# Patient Record
Sex: FEMALE | NOT HISPANIC OR LATINO
[De-identification: names, ages, dates, MRNs, and addresses within clinical notes are randomized per-mention and may not be internally consistent; named-entity substitution may affect disease eponyms.]

---

## 2017-02-17 ENCOUNTER — LAB SERVICES (OUTPATIENT)
Dept: OTHER | Age: 32
End: 2017-02-17

## 2017-02-17 ENCOUNTER — CHARTING TRANS (OUTPATIENT)
Dept: OBGYN | Age: 32
End: 2017-02-17

## 2017-02-22 LAB — AP REPORT: ABNORMAL

## 2017-03-01 LAB — HPV I/H RISK 4 DNA CVX QL NAA+PROBE: NORMAL

## 2017-03-14 ENCOUNTER — CHARTING TRANS (OUTPATIENT)
Dept: OBGYN | Age: 32
End: 2017-03-14

## 2017-03-14 ENCOUNTER — LAB SERVICES (OUTPATIENT)
Dept: OTHER | Age: 32
End: 2017-03-14

## 2017-03-14 ENCOUNTER — MYAURORA ACCOUNT LINK (OUTPATIENT)
Dept: OTHER | Age: 32
End: 2017-03-14

## 2017-03-14 LAB — PREGNANCY TEST, URINE: NEGATIVE

## 2017-03-16 LAB — AP REPORT: NORMAL

## 2017-05-04 ENCOUNTER — CHARTING TRANS (OUTPATIENT)
Dept: OTHER | Age: 32
End: 2017-05-04

## 2018-11-28 VITALS
HEIGHT: 72 IN | BODY MASS INDEX: 29.26 KG/M2 | WEIGHT: 216 LBS | SYSTOLIC BLOOD PRESSURE: 112 MMHG | DIASTOLIC BLOOD PRESSURE: 80 MMHG

## 2018-11-29 VITALS
HEIGHT: 72 IN | BODY MASS INDEX: 29.53 KG/M2 | SYSTOLIC BLOOD PRESSURE: 118 MMHG | DIASTOLIC BLOOD PRESSURE: 68 MMHG | WEIGHT: 218 LBS

## 2020-10-20 LAB
ANION GAP SERPL CALC-SCNC: 9 MMOL/L (ref 5–19)
APPEARANCE UR: CLEAR
APTT PPP: YELLOW S
BILIRUB UR QL STRIP: NEGATIVE
BUN SERPL-MCNC: 18 MG/DL (ref 7–20)
CALCIUM: 9.5 MG/DL (ref 8.4–10.2)
CHLORIDE SERPL-SCNC: 103 MMOL/L (ref 98–107)
CO2 SERPL-SCNC: 26 MMOL/L (ref 22–30)
ERYTHROCYTE [DISTWIDTH] IN BLOOD BY AUTOMATED COUNT: 12.8 % (ref 11.5–14)
GLUCOSE SERPL-MCNC: 87 MG/DL (ref 75–110)
GLUCOSE UR STRIP-MCNC: NEGATIVE MG/DL
HCT VFR BLD CALC: 40.6 % (ref 36–47)
HGB BLD-MCNC: 14 G/DL (ref 12–15.5)
KETONES UR STRIP-MCNC: NEGATIVE MG/DL
MCH RBC QN AUTO: 29.8 PG (ref 27–33.4)
MCHC RBC AUTO-ENTMCNC: 34.5 G/DL (ref 32–36)
MCV RBC AUTO: 86 FL (ref 80–97)
NITRITE UR QL STRIP: NEGATIVE
PH UR STRIP: 5 [PH] (ref 5–9)
PLATELET # BLD: 262 10^3/UL (ref 150–450)
POTASSIUM SERPL-SCNC: 4.2 MMOL/L (ref 3.6–5)
PROT UR STRIP-MCNC: NEGATIVE MG/DL
RBC # BLD AUTO: 4.7 10^6/UL (ref 3.72–5.28)
SP GR UR STRIP: 1.02
UROBILINOGEN UR-MCNC: NEGATIVE MG/DL (ref ?–2)
WBC # BLD AUTO: 6.6 10^3/UL (ref 4–10.5)

## 2020-10-23 ENCOUNTER — HOSPITAL ENCOUNTER (OUTPATIENT)
Dept: HOSPITAL 62 - OROUT | Age: 35
Discharge: HOME | End: 2020-10-23
Attending: SPECIALIST
Payer: OTHER GOVERNMENT

## 2020-10-23 VITALS — DIASTOLIC BLOOD PRESSURE: 85 MMHG | SYSTOLIC BLOOD PRESSURE: 126 MMHG

## 2020-10-23 DIAGNOSIS — J45.909: ICD-10-CM

## 2020-10-23 DIAGNOSIS — Z30.2: Primary | ICD-10-CM

## 2020-10-23 DIAGNOSIS — Z03.818: ICD-10-CM

## 2020-10-23 DIAGNOSIS — I47.1: ICD-10-CM

## 2020-10-23 PROCEDURE — 80048 BASIC METABOLIC PNL TOTAL CA: CPT

## 2020-10-23 PROCEDURE — 81025 URINE PREGNANCY TEST: CPT

## 2020-10-23 PROCEDURE — 87635 SARS-COV-2 COVID-19 AMP PRB: CPT

## 2020-10-23 PROCEDURE — 81001 URINALYSIS AUTO W/SCOPE: CPT

## 2020-10-23 PROCEDURE — 86900 BLOOD TYPING SEROLOGIC ABO: CPT

## 2020-10-23 PROCEDURE — 86901 BLOOD TYPING SEROLOGIC RH(D): CPT

## 2020-10-23 PROCEDURE — 85027 COMPLETE CBC AUTOMATED: CPT

## 2020-10-23 PROCEDURE — 58671 LAPAROSCOPY TUBAL BLOCK: CPT

## 2020-10-23 PROCEDURE — 86850 RBC ANTIBODY SCREEN: CPT

## 2020-10-23 PROCEDURE — C9803 HOPD COVID-19 SPEC COLLECT: HCPCS

## 2020-10-23 PROCEDURE — 36415 COLL VENOUS BLD VENIPUNCTURE: CPT

## 2020-10-23 NOTE — OPERATIVE REPORT
Operative Report


DATE OF SURGERY: 10/23/20


PREOPERATIVE DIAGNOSIS: Desires sterilization


POSTOPERATIVE DIAGNOSIS: same


OPERATION: Open laparoscopy, Filshie clip sterilization


SURGEON: LEONID SALAS


ANESTHESIA: GA


TISSUE REMOVED OR ALTERED: None


COMPLICATIONS: 





None


ESTIMATED BLOOD LOSS: 5 mL


INTRAOPERATIVE FINDINGS: Normal upper abdomen normal tubes ovaries uterus 

cul-de-sac.  Appendix was not visualized


PROCEDURE: 





The usual risks, benefits, expectations and complications of bleeding, i

nfection, anesthesia and damage to other organs have been explaed and 

understood.  No guarantee of permanent sterilization have been made with a risk 

of pregnancy of about 1/150/10 years with this procedure.


After surgical time out, exam under anesthesia, hulka tenaculum placed, and 

bladder catheterization ensued. Normal anatomy was noted.  The surgeon re-gloved

and via an infraumbilical incision thru the subq fat and fascia, the peritoneum 

was encountered and entered uneventfully. Orgin cannula placed and inflated.  

The pelvis and upper abdomen was examined, a second 5 mm port placed 3 finger

breadths above symphysis under direct visualization, the tubes identified in 

their entirety and filshie clips placed on the antimesenteric side 1 cm from the

uterine fundus bilaterally.  


The gas and instruments were removed, Fasci closed with O vicryl, subq with 3O 

gut